# Patient Record
Sex: FEMALE | Race: WHITE | HISPANIC OR LATINO | URBAN - METROPOLITAN AREA
[De-identification: names, ages, dates, MRNs, and addresses within clinical notes are randomized per-mention and may not be internally consistent; named-entity substitution may affect disease eponyms.]

---

## 2018-06-15 ENCOUNTER — OFFICE VISIT (OUTPATIENT)
Dept: URBAN - METROPOLITAN AREA CLINIC 48 | Facility: CLINIC | Age: 83
End: 2018-06-15
Payer: COMMERCIAL

## 2018-06-15 DIAGNOSIS — H18.421 BAND KERATOPATHY, RIGHT EYE: ICD-10-CM

## 2018-06-15 DIAGNOSIS — H35.341 MACULAR CYST, HOLE, OR PSEUDOHOLE, RIGHT EYE: ICD-10-CM

## 2018-06-15 DIAGNOSIS — E11.3593 TYPE 2 DIABETES MELLITUS W/ PROLIFERATIVE DIABETIC RETINOPATHY W/O MACULAR EDEMA, BILATERAL: Primary | ICD-10-CM

## 2018-06-15 PROCEDURE — 92134 CPTRZ OPH DX IMG PST SGM RTA: CPT | Performed by: OPHTHALMOLOGY

## 2018-06-15 PROCEDURE — 99213 OFFICE O/P EST LOW 20 MIN: CPT | Performed by: OPHTHALMOLOGY

## 2018-06-15 ASSESSMENT — INTRAOCULAR PRESSURE
OD: 11
OS: 11

## 2018-06-15 ASSESSMENT — VISUAL ACUITY: OD: 20/200

## 2018-06-15 NOTE — IMPRESSION/PLAN
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 Plan: Discussed with Patient to instill artificial tears as needed daily for pain. Discussed to have a consult with Dr. Rafal Schwartz if symptoms persist. Patient understands and agrees with plan.

## 2018-06-15 NOTE — IMPRESSION/PLAN
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 Plan: OCT ordered and performed today. Discussed diagnosis with patient. The clinical exam and OCT are consistent with Macular Hole. Given the patients current symptoms and difficulties with daily activities surgical correction is not recommended at this time due to quality of vision after surgery is likely not to improve. Discussed with patient in great detail her diagnosis. Patient understands and agrees with plan.

## 2020-02-21 ENCOUNTER — OFFICE VISIT (OUTPATIENT)
Dept: URBAN - METROPOLITAN AREA CLINIC 48 | Facility: CLINIC | Age: 85
End: 2020-02-21
Payer: COMMERCIAL

## 2020-02-21 DIAGNOSIS — H04.123 DRY EYE SYNDROME OF BILATERAL LACRIMAL GLANDS: ICD-10-CM

## 2020-02-21 PROCEDURE — 99213 OFFICE O/P EST LOW 20 MIN: CPT | Performed by: OPHTHALMOLOGY

## 2020-02-21 PROCEDURE — 92134 CPTRZ OPH DX IMG PST SGM RTA: CPT | Performed by: OPHTHALMOLOGY

## 2020-02-21 ASSESSMENT — INTRAOCULAR PRESSURE
OS: 5
OD: 7

## 2020-02-21 NOTE — IMPRESSION/PLAN
Impression: Dry eye syndrome of bilateral lacrimal glands: H04.123. OU. Band keratopathy Plan: Discussed diagnosis in detail with patient. Advised patient of condition. Will continue to observe condition and or symptoms. Recommend artificial tears OU.

## 2020-02-21 NOTE — IMPRESSION/PLAN
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 OD. Plan: OCT ordered and performed today. Discussed diagnosis with patient. The clinical exam was consistent with Epiretinal membrane. The diagnosis and natural history and prognosis of Epiretinal membrane, as well as the risks and benefits with Vitrectomy with membrane peeling were discussed. Given the current visual acuity  the patient elects to observe for now.

## 2021-02-23 ENCOUNTER — OFFICE VISIT (OUTPATIENT)
Dept: URBAN - METROPOLITAN AREA CLINIC 48 | Facility: CLINIC | Age: 86
End: 2021-02-23
Payer: COMMERCIAL

## 2021-02-23 PROCEDURE — 92014 COMPRE OPH EXAM EST PT 1/>: CPT | Performed by: OPHTHALMOLOGY

## 2021-02-23 RX ORDER — PREDNISOLONE ACETATE 10 MG/ML
1 % SUSPENSION/ DROPS OPHTHALMIC
Qty: 5 | Refills: 1 | Status: INACTIVE
Start: 2021-02-23 | End: 2021-09-29

## 2021-02-23 ASSESSMENT — INTRAOCULAR PRESSURE
OD: 12
OS: 9

## 2021-02-23 NOTE — IMPRESSION/PLAN
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 Plan: Patient to continue Genteal qid OS. and start PF bid OS Discussed EDTA Discussed calcium deposits ,patient understand RTC 3 weeks follow up

## 2021-02-23 NOTE — IMPRESSION/PLAN
Impression: Other secondary cataract, left eye: H26.492. Plan: Patient not interested in laser at this time, will think about it.

## 2021-03-17 ENCOUNTER — OFFICE VISIT (OUTPATIENT)
Dept: URBAN - METROPOLITAN AREA CLINIC 48 | Facility: CLINIC | Age: 86
End: 2021-03-17
Payer: COMMERCIAL

## 2021-03-17 DIAGNOSIS — H18.423 BAND KERATOPATHY, BILATERAL: Primary | ICD-10-CM

## 2021-03-17 PROCEDURE — 92012 INTRM OPH EXAM EST PATIENT: CPT | Performed by: OPHTHALMOLOGY

## 2021-03-17 ASSESSMENT — INTRAOCULAR PRESSURE: OS: 10

## 2021-03-17 NOTE — IMPRESSION/PLAN
Impression: Band keratopathy, bilateral: Y17.777. Plan: Patient reports pain has subsided. Start decreasing Genteal to BID OS. and PF to QD OS Discussed w/pt. pain arises increase gtts back to Genteal QID and PF BID Patient aware to call or rtc prior to scheduled appt. if condition worsens or new symptoms develop. Continue w/appt. as scheduled 04/02/21 w/ Dr. Florecita Li.  

RTC 2 months for IOP check

## 2021-04-02 ENCOUNTER — OFFICE VISIT (OUTPATIENT)
Dept: URBAN - METROPOLITAN AREA CLINIC 48 | Facility: CLINIC | Age: 86
End: 2021-04-02
Payer: COMMERCIAL

## 2021-04-02 PROCEDURE — 99214 OFFICE O/P EST MOD 30 MIN: CPT | Performed by: OPHTHALMOLOGY

## 2021-04-02 PROCEDURE — 92134 CPTRZ OPH DX IMG PST SGM RTA: CPT | Performed by: OPHTHALMOLOGY

## 2021-04-02 ASSESSMENT — INTRAOCULAR PRESSURE
OD: 11
OS: 11

## 2021-04-02 NOTE — IMPRESSION/PLAN
Impression: Type 2 diabetes mellitus w/ proliferative diabetic retinopathy w/o macular edema, bilateral: O20.2701. Plan: OCT ordered and performed today. The clinical exam is consistent with proliferative diabetic retinopathy. Discussed diagnosis with patient. Recommend close observation at this time. Discussed risk of progression with the present condition. The patient was advised to maintain tight blood sugar control, blood pressure and lipid control. Patient agrees with plan.

## 2021-05-18 ENCOUNTER — OFFICE VISIT (OUTPATIENT)
Dept: URBAN - METROPOLITAN AREA CLINIC 48 | Facility: CLINIC | Age: 86
End: 2021-05-18
Payer: COMMERCIAL

## 2021-05-18 PROCEDURE — 99213 OFFICE O/P EST LOW 20 MIN: CPT | Performed by: OPHTHALMOLOGY

## 2021-05-18 ASSESSMENT — INTRAOCULAR PRESSURE
OS: 11
OD: 12

## 2021-05-18 NOTE — IMPRESSION/PLAN
Impression: Band keratopathy, bilateral: V63.406. Plan: Patint reports OS is doing much better, still gets intermittent pain episodes but not as bad as before. Will continue to monitor at this time. Continue and increase: Genteal to BID-QID OS.  and PF to QD-BID OS


RTC 4mo DE/OCT Mac

## 2021-09-29 ENCOUNTER — OFFICE VISIT (OUTPATIENT)
Dept: URBAN - METROPOLITAN AREA CLINIC 48 | Facility: CLINIC | Age: 86
End: 2021-09-29
Payer: COMMERCIAL

## 2021-09-29 DIAGNOSIS — H10.413 CONJUNCTIVITIS - ALLERGIC: ICD-10-CM

## 2021-09-29 PROCEDURE — 92014 COMPRE OPH EXAM EST PT 1/>: CPT | Performed by: OPHTHALMOLOGY

## 2021-09-29 RX ORDER — PREDNISOLONE ACETATE 10 MG/ML
1 % SUSPENSION/ DROPS OPHTHALMIC
Qty: 5 | Refills: 1 | Status: INACTIVE
Start: 2021-09-29 | End: 2022-02-23

## 2021-09-29 ASSESSMENT — INTRAOCULAR PRESSURE
OS: 8
OD: 12

## 2021-09-29 NOTE — IMPRESSION/PLAN
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 OD. Plan:  Discussed with patient.   



Refer to Retina 2 months DE

## 2021-09-29 NOTE — IMPRESSION/PLAN
Impression: Conjunctivitis - Allergic: H10.413. Plan: patient has itching both upper lids.   PF and genteal gelqid OU

RTC 1 week fup

## 2021-09-29 NOTE — IMPRESSION/PLAN
Impression: Band keratopathy, bilateral: F03.328. Plan: Patint reports OS is doing much better, still gets intermittent pain episodes but not as bad as before. Will continue to monitor at this time. Continue and increase: Genteal to QID OS.  and PF to qid  OS


RTC 1 week weeks follow up

## 2021-09-29 NOTE — IMPRESSION/PLAN
Impression: Other secondary cataract, left eye: H26.492. Plan: Patient has a denser PCO OS then before. Offered YAg laser OS. R/B/A discussed. Patient to think about laser and let us know.

## 2021-10-15 ENCOUNTER — OFFICE VISIT (OUTPATIENT)
Dept: URBAN - METROPOLITAN AREA CLINIC 48 | Facility: CLINIC | Age: 86
End: 2021-10-15
Payer: COMMERCIAL

## 2021-10-15 PROCEDURE — 92012 INTRM OPH EXAM EST PATIENT: CPT | Performed by: OPHTHALMOLOGY

## 2021-10-15 ASSESSMENT — INTRAOCULAR PRESSURE
OD: 13
OS: 7

## 2021-10-15 NOTE — IMPRESSION/PLAN
Impression: Band keratopathy, bilateral: R61.285. Plan: Patint reports OS is doing much better, still gets intermittent pain episodes but not as bad as before. Will continue to monitor at this time. Continue and increase: Genteal to QID OS.  and PF to Bid  OS


RTC Jan  followup

## 2021-12-01 ENCOUNTER — OFFICE VISIT (OUTPATIENT)
Dept: URBAN - METROPOLITAN AREA CLINIC 48 | Facility: CLINIC | Age: 86
End: 2021-12-01
Payer: COMMERCIAL

## 2021-12-01 DIAGNOSIS — E11.3553 TYPE 2 DIABETES WITH STABLE PROLIF DIABETIC RTNOP, BILATERAL: Primary | ICD-10-CM

## 2021-12-01 PROCEDURE — 99214 OFFICE O/P EST MOD 30 MIN: CPT | Performed by: OPHTHALMOLOGY

## 2021-12-01 PROCEDURE — 92134 CPTRZ OPH DX IMG PST SGM RTA: CPT | Performed by: OPHTHALMOLOGY

## 2021-12-01 ASSESSMENT — INTRAOCULAR PRESSURE
OD: 11
OS: 12

## 2021-12-01 NOTE — IMPRESSION/PLAN
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 Plan: OCT ordered and performed today. Discussed diagnosis with patient. The clinical exam and OCT are consistent with Macular Hole. Given the patients current symptoms recommended observation at this time.

## 2021-12-01 NOTE — IMPRESSION/PLAN
Impression: Other secondary cataract, left eye: H26.492. Plan: Discussed diagnosis in detail with patient. Discussed treatment options with patient. Discussed risks and benefits and patient understands. Reassured patient of current condition and treatment. Call if [address] worsens. dicussed poss. Yag OS for better view to the retina, most likely no VA improvements.

## 2021-12-01 NOTE — IMPRESSION/PLAN
Impression: Type 2 diabetes with stable prolif diabetic rtnop, bilateral: B57.6360. Plan: OCT ordered and performed today. Discussed diagnosis with patient. The clinical exam was consistent with Type 2 diabetes. The patient was advised to maintain tight blood sugar control, blood pressure and lipid control. Recommend patient follow up in 1 year with DFE. Patient was also advised to keep all appointments with PCP for diabetic evaluation and counseling to avoid the systemic complications of diabetes.

## 2022-02-23 ENCOUNTER — OFFICE VISIT (OUTPATIENT)
Dept: URBAN - METROPOLITAN AREA CLINIC 48 | Facility: CLINIC | Age: 87
End: 2022-02-23
Payer: COMMERCIAL

## 2022-02-23 DIAGNOSIS — H26.492 OTHER SECONDARY CATARACT, LEFT EYE: Primary | ICD-10-CM

## 2022-02-23 PROCEDURE — 92014 COMPRE OPH EXAM EST PT 1/>: CPT | Performed by: OPHTHALMOLOGY

## 2022-02-23 RX ORDER — PREDNISOLONE ACETATE 10 MG/ML
1 % SUSPENSION/ DROPS OPHTHALMIC
Qty: 5 | Refills: 1 | Status: INACTIVE
Start: 2022-02-23 | End: 2022-03-24

## 2022-02-23 ASSESSMENT — INTRAOCULAR PRESSURE
OD: 11
OS: 9

## 2022-02-23 NOTE — IMPRESSION/PLAN
Impression: Other secondary cataract, left eye: H26.492. Plan: Dr. Mona Cervantes would like PC haze removed OS, for better view to better treat OS. Risks, benefits, alternatives, and expectations of YAG capsulotomy were discussed. The patient desires a YAG capsulotomy in the left eye. Schedule YAG capsulotomy in the left eye with 1 day PO1 then 1 week PO. RL 2 Patient to think of laser and let us know when she is ready.

## 2022-02-23 NOTE — IMPRESSION/PLAN
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  Plan: Continue Genteal qid OU


RTC  3 months with Dr. Sergio De Los Santos ( retina specialist )

## 2022-02-23 NOTE — IMPRESSION/PLAN
Impression: Band keratopathy, bilateral: A65.138. Plan: Patient reports OS is doing much better, still gets intermittent pain episodes but not as bad as before. Will continue to monitor at this time. Continue and increase: Genteal to QID OS.  and PF to Tid  OS


RTC 6 weeks follow up

## 2022-05-10 ENCOUNTER — OFFICE VISIT (OUTPATIENT)
Dept: URBAN - METROPOLITAN AREA CLINIC 48 | Facility: CLINIC | Age: 87
End: 2022-05-10
Payer: COMMERCIAL

## 2022-05-10 DIAGNOSIS — E11.3553 TYPE 2 DIABETES WITH STABLE PROLIF DIABETIC RTNOP, BILATERAL: ICD-10-CM

## 2022-05-10 DIAGNOSIS — H18.423 BAND KERATOPATHY, BILATERAL: Primary | ICD-10-CM

## 2022-05-10 PROCEDURE — 92012 INTRM OPH EXAM EST PATIENT: CPT | Performed by: OPHTHALMOLOGY

## 2022-05-10 ASSESSMENT — INTRAOCULAR PRESSURE
OS: 9
OD: 11

## 2022-05-10 NOTE — IMPRESSION/PLAN
Impression: Band keratopathy, bilateral: X09.516. Plan: Patient reports OS is doing much better, still gets intermittent pain episodes but not as bad as before. Will continue to monitor at this time. Continue and increase: Genteal to QID OS. 


RTC 6 months followip

## 2022-06-01 ENCOUNTER — OFFICE VISIT (OUTPATIENT)
Dept: URBAN - METROPOLITAN AREA CLINIC 48 | Facility: CLINIC | Age: 87
End: 2022-06-01
Payer: COMMERCIAL

## 2022-06-01 DIAGNOSIS — H35.341 MACULAR CYST, HOLE, OR PSEUDOHOLE, RIGHT EYE: ICD-10-CM

## 2022-06-01 DIAGNOSIS — Z96.1 PRESENCE OF INTRAOCULAR LENS: ICD-10-CM

## 2022-06-01 DIAGNOSIS — H18.423 BAND KERATOPATHY, BILATERAL: ICD-10-CM

## 2022-06-01 DIAGNOSIS — E11.3553 TYPE 2 DIABETES WITH STABLE PROLIF DIABETIC RTNOP, BILATERAL: Primary | ICD-10-CM

## 2022-06-01 PROCEDURE — 92134 CPTRZ OPH DX IMG PST SGM RTA: CPT | Performed by: OPHTHALMOLOGY

## 2022-06-01 PROCEDURE — 99214 OFFICE O/P EST MOD 30 MIN: CPT | Performed by: OPHTHALMOLOGY

## 2022-06-01 ASSESSMENT — INTRAOCULAR PRESSURE
OS: 6
OD: 12

## 2022-06-01 NOTE — IMPRESSION/PLAN
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 Plan: Chronic appearing.  Will observe given advanced ischemia

## 2022-06-01 NOTE — IMPRESSION/PLAN
Impression: Type 2 diabetes with stable prolif diabetic rtnop, bilateral: T70.4413. OCT: 06/01/22 OD: MH
OS: atrophy Plan: Thorough examination reveals quiescent proliferative diabetic retinopathy following panretinal photocoagulation. The importance of blood sugar, blood pressure, and cholesterol control; and their relationship to progression of diabetic retinopathy were reviewed with the patient. The patient was urged to work closely with their PCP to avoid systemic complications of diabetic disease. 

RTC 6 months DFE/OCT OU re-eval

## 2023-03-23 NOTE — IMPRESSION/PLAN
Impression: Type 2 diabetes with stable prolif diabetic rtnop, bilateral: U79.3528.  Plan: RTC 6 months DM/OCT mac Topical Steroids Counseling: I discussed with the patient that prolonged use of topical steroids can result in the increased appearance of superficial blood vessels (telangiectasias), lightening (hypopigmentation) and thinning of the skin (atrophy).  Patient understands to avoid using high potency steroids in skin folds, the groin or the face.  The patient verbalized understanding of the proper use and possible adverse effects of topical steroids.  All of the patient's questions and concerns were addressed.